# Patient Record
Sex: FEMALE | Race: WHITE | NOT HISPANIC OR LATINO | Employment: UNEMPLOYED | URBAN - METROPOLITAN AREA
[De-identification: names, ages, dates, MRNs, and addresses within clinical notes are randomized per-mention and may not be internally consistent; named-entity substitution may affect disease eponyms.]

---

## 2020-07-27 ENCOUNTER — HOSPITAL ENCOUNTER (EMERGENCY)
Facility: MEDICAL CENTER | Age: 45
End: 2020-07-27
Attending: EMERGENCY MEDICINE
Payer: COMMERCIAL

## 2020-07-27 ENCOUNTER — APPOINTMENT (OUTPATIENT)
Dept: RADIOLOGY | Facility: MEDICAL CENTER | Age: 45
End: 2020-07-27
Attending: EMERGENCY MEDICINE
Payer: COMMERCIAL

## 2020-07-27 VITALS
SYSTOLIC BLOOD PRESSURE: 122 MMHG | WEIGHT: 193.34 LBS | HEIGHT: 67 IN | BODY MASS INDEX: 30.35 KG/M2 | OXYGEN SATURATION: 95 % | RESPIRATION RATE: 18 BRPM | TEMPERATURE: 97.5 F | HEART RATE: 83 BPM | DIASTOLIC BLOOD PRESSURE: 83 MMHG

## 2020-07-27 DIAGNOSIS — M79.605 LEFT LEG PAIN: ICD-10-CM

## 2020-07-27 DIAGNOSIS — R60.9 PERIPHERAL EDEMA: ICD-10-CM

## 2020-07-27 LAB
ANION GAP SERPL CALC-SCNC: 12 MMOL/L (ref 7–16)
APTT PPP: 21.9 SEC (ref 24.7–36)
BASOPHILS # BLD AUTO: 0.9 % (ref 0–1.8)
BASOPHILS # BLD: 0.07 K/UL (ref 0–0.12)
BUN SERPL-MCNC: 11 MG/DL (ref 8–22)
CALCIUM SERPL-MCNC: 8.8 MG/DL (ref 8.5–10.5)
CHLORIDE SERPL-SCNC: 107 MMOL/L (ref 96–112)
CO2 SERPL-SCNC: 22 MMOL/L (ref 20–33)
CREAT SERPL-MCNC: 0.95 MG/DL (ref 0.5–1.4)
D DIMER PPP IA.FEU-MCNC: 0.37 UG/ML (FEU) (ref 0–0.5)
EKG IMPRESSION: NORMAL
EOSINOPHIL # BLD AUTO: 0.22 K/UL (ref 0–0.51)
EOSINOPHIL NFR BLD: 2.9 % (ref 0–6.9)
ERYTHROCYTE [DISTWIDTH] IN BLOOD BY AUTOMATED COUNT: 51.8 FL (ref 35.9–50)
GLUCOSE SERPL-MCNC: 115 MG/DL (ref 65–99)
HCG SERPL QL: NEGATIVE
HCT VFR BLD AUTO: 42.6 % (ref 37–47)
HGB BLD-MCNC: 13.5 G/DL (ref 12–16)
IMM GRANULOCYTES # BLD AUTO: 0.03 K/UL (ref 0–0.11)
IMM GRANULOCYTES NFR BLD AUTO: 0.4 % (ref 0–0.9)
INR PPP: 0.99 (ref 0.87–1.13)
LYMPHOCYTES # BLD AUTO: 3.25 K/UL (ref 1–4.8)
LYMPHOCYTES NFR BLD: 42.8 % (ref 22–41)
MCH RBC QN AUTO: 30.8 PG (ref 27–33)
MCHC RBC AUTO-ENTMCNC: 31.7 G/DL (ref 33.6–35)
MCV RBC AUTO: 97 FL (ref 81.4–97.8)
MONOCYTES # BLD AUTO: 0.5 K/UL (ref 0–0.85)
MONOCYTES NFR BLD AUTO: 6.6 % (ref 0–13.4)
NEUTROPHILS # BLD AUTO: 3.53 K/UL (ref 2–7.15)
NEUTROPHILS NFR BLD: 46.4 % (ref 44–72)
NRBC # BLD AUTO: 0.02 K/UL
NRBC BLD-RTO: 0.3 /100 WBC
NT-PROBNP SERPL IA-MCNC: 271 PG/ML (ref 0–125)
PLATELET # BLD AUTO: 275 K/UL (ref 164–446)
PMV BLD AUTO: 9.3 FL (ref 9–12.9)
POTASSIUM SERPL-SCNC: 3.4 MMOL/L (ref 3.6–5.5)
PROTHROMBIN TIME: 13.4 SEC (ref 12–14.6)
RBC # BLD AUTO: 4.39 M/UL (ref 4.2–5.4)
SODIUM SERPL-SCNC: 141 MMOL/L (ref 135–145)
TROPONIN T SERPL-MCNC: 9 NG/L (ref 6–19)
WBC # BLD AUTO: 7.6 K/UL (ref 4.8–10.8)

## 2020-07-27 PROCEDURE — 80048 BASIC METABOLIC PNL TOTAL CA: CPT

## 2020-07-27 PROCEDURE — 93971 EXTREMITY STUDY: CPT | Mod: 26,LT | Performed by: INTERNAL MEDICINE

## 2020-07-27 PROCEDURE — 85025 COMPLETE CBC W/AUTO DIFF WBC: CPT

## 2020-07-27 PROCEDURE — 85610 PROTHROMBIN TIME: CPT

## 2020-07-27 PROCEDURE — 99283 EMERGENCY DEPT VISIT LOW MDM: CPT

## 2020-07-27 PROCEDURE — 83880 ASSAY OF NATRIURETIC PEPTIDE: CPT

## 2020-07-27 PROCEDURE — 93005 ELECTROCARDIOGRAM TRACING: CPT | Performed by: EMERGENCY MEDICINE

## 2020-07-27 PROCEDURE — 84703 CHORIONIC GONADOTROPIN ASSAY: CPT

## 2020-07-27 PROCEDURE — 85730 THROMBOPLASTIN TIME PARTIAL: CPT

## 2020-07-27 PROCEDURE — 93971 EXTREMITY STUDY: CPT | Mod: LT

## 2020-07-27 PROCEDURE — 85379 FIBRIN DEGRADATION QUANT: CPT

## 2020-07-27 PROCEDURE — 84484 ASSAY OF TROPONIN QUANT: CPT

## 2020-07-27 ASSESSMENT — ENCOUNTER SYMPTOMS
VOMITING: 0
BACK PAIN: 0
CHILLS: 0
BLURRED VISION: 0
SORE THROAT: 0
SEIZURES: 0
COUGH: 0
ABDOMINAL PAIN: 0
EYE REDNESS: 0
FEVER: 0
NECK PAIN: 0
SHORTNESS OF BREATH: 1
HEADACHES: 0
FOCAL WEAKNESS: 0

## 2020-07-27 NOTE — ED TRIAGE NOTES
"Ele Page   45 y.o. female   Chief Complaint   Patient presents with   • Leg Pain     left leg, left leg is slightly swollen and red, had PE three weeks ago, thinks she may have blood clot in her left leg, is on eliquis though she is now out of it      Pt amb to triage with slow but steady gait for above complaint.      Pt is alert and oriented, speaking in full sentences, follows commands and responds appropriately to questions. Resp are even and unlabored.   Pt placed in lobby. Pt educated on triage process. Pt encouraged to alert staff for any changes.    /104   Pulse 89   Temp 36.3 °C (97.3 °F) (Temporal)   Resp 18   Ht 1.702 m (5' 7\")   Wt 87.7 kg (193 lb 5.5 oz)   SpO2 97%   BMI 30.28 kg/m²     "

## 2020-07-27 NOTE — ED PROVIDER NOTES
ED Provider Note    CHIEF COMPLAINT  Chief Complaint   Patient presents with   • Leg Pain     left leg, left leg is slightly swollen and red, had PE three weeks ago, thinks she may have blood clot in her left leg, is on eliquis though she is now out of it       HPI  Ele Page is a 45 y.o. 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  Patient reports pain started 2 days ago.  She notes pain to the back of the calf and the back of the knee with associated swelling.  She describes moderate, cramping, tight pain.  She denies any trauma to the extremity.  She has had intermittent chest pain over the last few weeks however was recently diagnosed with a pulmonary as embolism 3 weeks ago.  She also reports increased shortness of breath from her baseline from her COPD/asthma.  She is supposed to be taking Eliquis however ran out of the medication about a week and a half ago.  She did take a few days of Pradaxa that she had from a prior prescription.  The patient is visiting from [place] where she receives most of her medical care.    REVIEW OF SYSTEMS  See HPI for further details.   Review of Systems   Constitutional: Negative for chills and fever.   HENT: Negative for sore throat.    Eyes: Negative for blurred vision and redness.   Respiratory: Positive for shortness of breath. Negative for cough.    Cardiovascular: Positive for chest pain and leg swelling.   Gastrointestinal: Negative for abdominal pain and vomiting.   Genitourinary: Negative for dysuria and urgency.   Musculoskeletal: Negative for back pain and neck pain.        Left leg pain   Skin: Negative for rash.   Neurological: Negative for focal weakness, seizures and headaches.   Psychiatric/Behavioral: Negative for suicidal ideas.         PAST MEDICAL HISTORY       SOCIAL HISTORY  Social History     Tobacco Use   • Smoking status: Current Every Day Smoker     Packs/day: 2.00   •  "Smokeless tobacco: Never Used   Substance and Sexual Activity   • Alcohol use: Not Currently   • Drug use: Never   • Sexual activity: Not on file       SURGICAL HISTORY  patient denies any surgical history    CURRENT MEDICATIONS  Home Medications     Reviewed by Florentino Alvarado R.N. (Registered Nurse) on 07/27/20 at 0010  Med List Status: Unable to Obtain   Medication Last Dose Status        Patient Cabrera Taking any Medications                       ALLERGIES  No Known Allergies    PHYSICAL EXAM   VITAL SIGNS: /83   Pulse 83   Temp 36.4 °C (97.5 °F)   Resp 18   Ht 1.702 m (5' 7\")   Wt 87.7 kg (193 lb 5.5 oz)   SpO2 95%   BMI 30.28 kg/m²      Physical Exam   Constitutional: She is oriented to person, place, and time and well-developed, well-nourished, and in no distress. No distress.   Nontoxic-appearing middle-age female   HENT:   Head: Normocephalic and atraumatic.   Eyes: Pupils are equal, round, and reactive to light. Conjunctivae are normal.   Neck: Normal range of motion. Neck supple.   Cardiovascular: Normal rate, regular rhythm and normal heart sounds.   2+ DP pulses   Pulmonary/Chest: Effort normal and breath sounds normal. No respiratory distress.   Abdominal: Soft. She exhibits no distension. There is no abdominal tenderness.   Musculoskeletal: Normal range of motion.         General: Tenderness and edema present.      Comments: Slightly enlarged left calf, no pitting edema.  Good range of motion of the knee and ankle without significant pain.  No significant overlying erythema or warmth.   Neurological: She is alert and oriented to person, place, and time.   Moving all extremities spontaneously   Skin: Skin is warm and dry.   Psychiatric: Affect normal.         DIAGNOSTIC STUDIES    EKG  Results for orders placed or performed during the hospital encounter of 07/27/20   EKG (Now)   Result Value Ref Range    Report       [facility] Emergency Dept.    Test Date:  " 2020  Pt Name:    ROSALINO CERDA                Department: ER  MRN:        [id number]                      Room:       [facility]  Gender:     Female                       Technician: [id number]  :        1975                   Requested By:DEBORAH GOMEZ  Order #:    [id number]                    Reading MD: Deborah Gomez MD    Measurements  Intervals                                Axis  Rate:       82                           P:          60  LA:         160                          QRS:        -70  QRSD:       90                           T:          41  QT:         396  QTc:        463    Interpretive Statements  SINUS RHYTHM  REINALDO, CONSIDER BIATRIAL ABNORMALITIES  LEFT ANTERIOR FASCICULAR BLOCK  BORDERLINE R WAVE PROGRESSION, ANTERIOR LEADS  No ST or T wave change  No previous ECG available for comparison  Electronically Signed On 2020 1:01:12 PDT by Deborah Gomez MD             LABS  Personally reviewed by me  Labs Reviewed   CBC WITH DIFFERENTIAL - Abnormal; Notable for the following components:       Result Value    MCHC 31.7 (*)     RDW 51.8 (*)     Lymphocytes 42.80 (*)     All other components within normal limits   BASIC METABOLIC PANEL - Abnormal; Notable for the following components:    Potassium 3.4 (*)     Glucose 115 (*)     All other components within normal limits   PROBRAIN NATRIURETIC PEPTIDE, NT - Abnormal; Notable for the following components:    NT-proBNP 271 (*)     All other components within normal limits   APTT - Abnormal; Notable for the following components:    APTT 21.9 (*)     All other components within normal limits    Narrative:     Indicate which anticoagulants the patient is on:->UNKNOWN   HCG QUAL SERUM   TROPONIN   D-DIMER   PROTHROMBIN TIME    Narrative:     Indicate which anticoagulants the patient is on:->UNKNOWN   ESTIMATED GFR           RADIOLOGY  Personally reviewed by me  US-EXTREMITY VENOUS LOWER UNILAT LEFT               ED COURSE  Vitals:    20 0004  "07/27/20 0007 07/27/20 0100 07/27/20 0158   BP: 145/104  124/86 122/83   Pulse: 89  79 83   Resp: 18  18 18   Temp: 36.3 °C (97.3 °F)   36.4 °C (97.5 °F)   TempSrc: Temporal      SpO2: 97%  95% 95%   Weight:  87.7 kg (193 lb 5.5 oz)     Height: 1.702 m (5' 7\")            Medications administered:  Medications - No data to display      Old records personally reviewed:  Reviewed records through care everywhere.  Patient has had an extensive history of DVTs, one that required thrombolysis in the upper extremity.  She is supposed to be on anticoagulation for life and did fail warfarin in the past.        MEDICAL DECISION MAKING  Patient with history of recurrent DVT/PEs who is supposed to be on Eliquis however did run out of her medication about a week ago and is now presenting with left lower extremity pain and swelling.  She is nontoxic-appearing with normal vital signs on arrival.  Her exam demonstrates possible mild nonpitting edema to the left lower extremity although it is not very significant.  She has no evidence of neurovascular compromise, septic arthritis, or trauma on exam.  Lower extremity ultrasound is negative for DVT.  Labs are reassuring including a negative d-dimer therefore my suspicion for underlying clot or pulmonary embolism is very low.  EKG is not concerning for ischemia or arrhythmia and troponin is negative without concern for ACS.  BNP is not significantly elevated to suggest decompensated heart failure.    Upon reassessment, patient is resting comfortably with normal vital signs.  No new complaints at this time.  Discussed results with patient and/or family as well as importance of primary care follow up.  She will be provided prescription for her Eliquis for the next month until she is able to follow-up with her primary care doctor when she returns to [place].  Patient understands plan of care and strict return precautions for new or changing symptoms.         IMPRESSION  (M79.605) Left " leg pain  (R60.9) Peripheral edema    Disposition: Discharge home, stable condition  Results, diagnoses, and treatment options were discussed with the patient and/or family. Patient verbalized understanding of plan of care.    Patient referred to primary care provider for monitoring and treatment of blood pressure.      Discharge Medication List as of 7/27/2020  2:01 AM      START taking these medications    Details   apixaban (ELIQUIS) 5mg Tab Take 1 Tab by mouth 2 Times a Day., Disp-60 Tab,R-0, Print Rx Paper                 Electronically signed by: Deborah Causey M.D., 7/27/2020 12:58 AM

## 2020-07-27 NOTE — DISCHARGE INSTRUCTIONS
You were seen in the Emergency Department for leg pain.      EKG, labs, ultrasound were completed without significant acute abnormalities.    Please use 1,000mg of tylenol every 6 hours as needed for pain.  Continue Eliquis as directed.    Please follow up with your primary care physician.    Return to the Emergency Department with worsening pain or swelling, chest worsening chest pain or shortness of breath, fevers, or other concerns.

## 2020-09-26 ENCOUNTER — HOSPITAL ENCOUNTER (EMERGENCY)
Facility: MEDICAL CENTER | Age: 45
End: 2020-09-26
Attending: EMERGENCY MEDICINE
Payer: COMMERCIAL

## 2020-09-26 ENCOUNTER — APPOINTMENT (OUTPATIENT)
Dept: RADIOLOGY | Facility: MEDICAL CENTER | Age: 45
End: 2020-09-26
Attending: EMERGENCY MEDICINE
Payer: COMMERCIAL

## 2020-09-26 VITALS
RESPIRATION RATE: 18 BRPM | HEART RATE: 74 BPM | BODY MASS INDEX: 28.48 KG/M2 | SYSTOLIC BLOOD PRESSURE: 117 MMHG | HEIGHT: 67 IN | DIASTOLIC BLOOD PRESSURE: 79 MMHG | OXYGEN SATURATION: 96 % | TEMPERATURE: 97 F | WEIGHT: 181.44 LBS

## 2020-09-26 DIAGNOSIS — M79.602 LEFT ARM PAIN: ICD-10-CM

## 2020-09-26 DIAGNOSIS — R07.89 OTHER CHEST PAIN: ICD-10-CM

## 2020-09-26 DIAGNOSIS — L03.112 CELLULITIS OF LEFT AXILLA: ICD-10-CM

## 2020-09-26 LAB
D DIMER PPP IA.FEU-MCNC: <0.27 UG/ML (FEU) (ref 0–0.5)
EKG IMPRESSION: NORMAL

## 2020-09-26 PROCEDURE — 700102 HCHG RX REV CODE 250 W/ 637 OVERRIDE(OP): Performed by: EMERGENCY MEDICINE

## 2020-09-26 PROCEDURE — 93005 ELECTROCARDIOGRAM TRACING: CPT | Performed by: EMERGENCY MEDICINE

## 2020-09-26 PROCEDURE — 93005 ELECTROCARDIOGRAM TRACING: CPT

## 2020-09-26 PROCEDURE — 85379 FIBRIN DEGRADATION QUANT: CPT

## 2020-09-26 PROCEDURE — A9270 NON-COVERED ITEM OR SERVICE: HCPCS | Performed by: EMERGENCY MEDICINE

## 2020-09-26 PROCEDURE — 36415 COLL VENOUS BLD VENIPUNCTURE: CPT

## 2020-09-26 PROCEDURE — 99284 EMERGENCY DEPT VISIT MOD MDM: CPT

## 2020-09-26 PROCEDURE — 71045 X-RAY EXAM CHEST 1 VIEW: CPT

## 2020-09-26 RX ORDER — CEPHALEXIN 500 MG/1
500 CAPSULE ORAL ONCE
Status: COMPLETED | OUTPATIENT
Start: 2020-09-26 | End: 2020-09-26

## 2020-09-26 RX ORDER — CEPHALEXIN 500 MG/1
500 TABLET ORAL 3 TIMES DAILY
Qty: 21 TAB | Refills: 0 | Status: SHIPPED | OUTPATIENT
Start: 2020-09-26 | End: 2020-10-03

## 2020-09-26 RX ORDER — ACETAMINOPHEN 325 MG/1
650 TABLET ORAL ONCE
Status: COMPLETED | OUTPATIENT
Start: 2020-09-26 | End: 2020-09-26

## 2020-09-26 RX ADMIN — ACETAMINOPHEN 650 MG: 325 TABLET, FILM COATED ORAL at 10:03

## 2020-09-26 RX ADMIN — CEPHALEXIN 500 MG: 500 CAPSULE ORAL at 13:37

## 2020-09-26 NOTE — ED PROVIDER NOTES
ED Provider Note    Scribed for Felton Sousa M.D. by Aarti Lubin. 9/26/2020  9:43 AM    Primary care provider: None  Means of arrival: Walk-in  History obtained from: Patient  History limited by: None    CHIEF COMPLAINT  Chief Complaint   Patient presents with   • Arm Pain   • Rib Pain   • Chest Pressure       HPI  Ele Page is a 45 y.o. female with a history of PE and DVT who presents to the Emergency Department for evaluation of upper left arm pain. She noticed the pain this morning when touching her outer arm. The patient states that the pain is similar to DVT pain in the past. The patient endorses associated chest pain which started this morning and last seconds at a time in duration. She reports that the chest pain is mild and is exacerbated with inspiration. She denies any associated diaphoresis, nausea, fever, cough, or COVID exposure. Additionally, the patient reports having an abscess in the left axilla. The patient had ad DVT in her left arm 5 years ago and then a PE 5 months ago. She was originally taking Pradaxa but is now on Eliquis. The patient has a history of hypertension but denies a history of hyperlipidemia. The patient has a family history of DVT. She is a current everyday smoker. Denies IV medication or IV drug use.     REVIEW OF SYSTEMS  Pertinent positives include: left arm pain and chest pain.  Pertinent negatives include: diaphoresis, nausea, fever, cough, or COVID exposure.  10+ systems reviewed and negative.      PAST MEDICAL HISTORY  Thromboembolic disease    FAMILY HISTORY  Surprisingly no thromboembolic disease    SOCIAL HISTORY  Social History     Tobacco Use   • Smoking status: Current Every Day Smoker     Packs/day: 2.00   • Smokeless tobacco: Never Used   Substance Use Topics   • Alcohol use: Not Currently   • Drug use: Never     Social History     Substance and Sexual Activity   Drug Use Never     CURRENT MEDICATIONS  Current Outpatient Medications:   •  apixaban  "(ELIQUIS) 5mg Tab, Take 1 Tab by mouth 2 Times a Day., Disp: 60 Tab, Rfl: 0    ALLERGIES  No Known Allergies    PHYSICAL EXAM  VITAL SIGNS: /96   Pulse 78   Temp 36.1 °C (97 °F) (Temporal)   Resp 18   Ht 1.702 m (5' 7\")   Wt 82.3 kg (181 lb 7 oz)   SpO2 99%   BMI 28.42 kg/m²   Reviewed and elevated blood pressure  Constitutional: Well developed, Well nourished  HENT: Normocephalic, atraumatic, bilateral external ears normal, wearing a mask.   Eyes: PERRLA, conjunctiva pink, no scleral icterus.   Cardiovascular: Regular rate and rhythm. 1/6murmur at the base, rubs or gallops.  No dependent edema or calf tenderness  Respiratory: Lungs clear to auscultation bilaterally. No wheezes, rales, or rhonchi.  Abdominal:  Abdomen soft, non-tender, non distended. No rebound, or guarding.    Skin: 2.5 cm induration in the left axilla with some crusting, nontender, no fluctuant.. Many axilla scars. No erythema, no rash.   Genitourinary: No costovertebral angle tenderness.   Musculoskeletal: Right lateral lower chest wall tenderness. Area of tenderness over left lateral deltoid with no palpable ropes. no deformities.   Neurologic: Alert & oriented x 3, cranial nerves 2-12 intact by passive exam.  No focal deficit noted.  Psychiatric: Affect normal, Judgment normal, Mood normal.     DIFFERENTIAL DIAGNOSIS:  Axillar abscess, hidradenitis suppurativa, DVT, PE, pneumonia, pneumothorax, doubt ACS    EKG  Interpreted by me    Rhythm:  Normal sinus rhythm   Rate: 76  Axis: -77  Ectopy: none  Conduction: normal  ST Segments: left anterior fasciculus block  T Waves: no acute change  Q Waves: none  Clinical Impression: Normal EKG without acute changes     RADIOLOGY/PROCEDURES  DX-CHEST-LIMITED (1 VIEW)   Final Result      No acute cardiopulmonary abnormality identified.        Radiologist interpretation have been reviewed by me.     LABORATORY:  Results for orders placed or performed during the hospital encounter of 09/26/20 "   D-DIMER   Result Value Ref Range    D-Dimer Screen <0.27 0.00 - 0.50 ug/mL (FEU)     Lab results reviewed by me.     INTERVENTIONS:  Medications   acetaminophen (TYLENOL) tablet 650 mg (650 mg Oral Given 9/26/20 1003)     Response: Improved.    ED COURSE:  Nursing notes, VS, PMSFHx reviewed in chart.     9:43 AM - Patient seen and examined at bedside. Patient will be treated with Tylenol 650 mg for her symptoms. Ordered EKG, DX-Chest, and D-Dimer to evaluate.     12:13 PM - I informed the patient that her lab and imaging results are reassuring.     12:20 PM - I discussed the patient's case with Social Work.     12:50 PM - Social Work updated me on resolved insurance issue.       MEDICAL DECISION MAKING:  Well-appearing patient presents with fears of DVT while on Eliquis.  She has had clots before this on warfarin and Pradaxa.  She also is concerned about an infection of the axilla.  There is no evidence of abscess or significant cellulitis but there is crusting as if there is been drainage to the axilla so I will treat with a course of Keflex.  Given negative d-dimer and location of her arm pain in the deltoid DVT is very unlikely as is PE so ultrasound and CT not necessary.  Her chest pain is reproducible on exam and her heart score is 1 making acute coronary syndrome unlikely.  There is no evidence of pneumonia or pneumothorax.    PLAN:  Discharge Medication List as of 9/26/2020  1:15 PM      START taking these medications    Details   Cephalexin 500 MG Tab Take 1 Tab by mouth 3 times a day for 7 days., Disp-21 Tab,R-0, Print Rx Paper         Eliquis refill  Chest pain nonspecific handout given  Return for shortness of breath, changing chest pain, leg swelling, ill appearance    [address] [address]  [phone]          CONDITION: good.     FINAL IMPRESSION  1. Other chest pain    2. Left arm pain    3. Cellulitis of left axilla          Aarti PAIZ  (Scribe), am scribing for, and in the presence of, Felton Sousa M.D..    Electronically signed by: Aarti Lubin (Scribe), 9/26/2020    I, Felton Sousa M.D. personally performed the services described in this documentation, as scribed by Aarti Lubin in my presence, and it is both accurate and complete.    C.     The note accurately reflects work and decisions made by me.  Felton Sousa M.D.  9/26/2020  3:45 PM

## 2020-09-26 NOTE — ED NOTES
"Message sent to pharmacy regarding antibiotic verification. Pt standing at doorway waiting for discharge, states she can wait \"a few minutes\" but is anxious to leave.   "

## 2020-09-26 NOTE — ED NOTES
Pt discharged to home. Pt was given follow up instructions and prescriptions for eliquis and rocephin. Pt verbalized understanding of all instructions for discharge and is ambulatory out of ED with steady gait.

## 2020-09-26 NOTE — ED NOTES
"Pt to triage, c/o lt arm pain, states \" she has a hx of DVT's , not sure if she has one again\" . Pt also has a hx of PE's , c/o rt rib and lt chest pressure. EKG done in triage   "

## 2020-09-26 NOTE — DISCHARGE INSTRUCTIONS
Continue the Eliquis twice daily.  Take Keflex as prescribed for possible armpit infection.  Return for worsening chest pain, significant shortness of breath, fever and spreading redness or ill appearance.  Follow-up with the [facility] clinic.  If you stay in [place] switch your insurance to Medicaid.    You had a borderline or high normal blood pressure reading today.  This does not necessarily mean you have hypertension.  Please followup with your/a primary physician for comprehensive blood pressure evaluation and yearly fasting cholesterol assessment.  BP Readings from Last 3 Encounters:   09/26/20 158/83   07/27/20 122/83

## 2020-09-26 NOTE — DISCHARGE PLANNING
Verified patient can fill meds at [facility].  Made patient aware needs aprimary care MD we are not here for refills.  Gave her resources for [facility]

## 2020-10-27 ENCOUNTER — APPOINTMENT (OUTPATIENT)
Dept: RADIOLOGY | Facility: MEDICAL CENTER | Age: 45
End: 2020-10-27
Attending: EMERGENCY MEDICINE
Payer: COMMERCIAL

## 2020-10-27 ENCOUNTER — HOSPITAL ENCOUNTER (EMERGENCY)
Facility: MEDICAL CENTER | Age: 45
End: 2020-10-28
Attending: EMERGENCY MEDICINE
Payer: COMMERCIAL

## 2020-10-27 ENCOUNTER — APPOINTMENT (OUTPATIENT)
Dept: RADIOLOGY | Facility: MEDICAL CENTER | Age: 45
End: 2020-10-27
Payer: COMMERCIAL

## 2020-10-27 DIAGNOSIS — F10.929 ALCOHOLIC INTOXICATION WITH COMPLICATION (HCC): ICD-10-CM
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
## 2020-10-27 DIAGNOSIS — K76.9 LIVER LESION: ICD-10-CM

## 2020-10-27 DIAGNOSIS — S09.90XA CLOSED HEAD INJURY, INITIAL ENCOUNTER: ICD-10-CM

## 2020-10-27 DIAGNOSIS — Z79.01 ANTICOAGULATED: ICD-10-CM

## 2020-10-27 DIAGNOSIS — I71.21 ANEURYSM OF ASCENDING AORTA (HCC): ICD-10-CM

## 2020-10-27 DIAGNOSIS — M54.2 CERVICALGIA: ICD-10-CM

## 2020-10-27 DIAGNOSIS — S00.31XA ABRASION OF NOSE, INITIAL ENCOUNTER: ICD-10-CM

## 2020-10-27 LAB
ABO GROUP BLD: NORMAL
ALBUMIN SERPL BCP-MCNC: 3.6 G/DL (ref 3.2–4.9)
ALBUMIN/GLOB SERPL: 1.5 G/DL
ALP SERPL-CCNC: 56 U/L (ref 30–99)
ALT SERPL-CCNC: 12 U/L (ref 2–50)
ANION GAP SERPL CALC-SCNC: 11 MMOL/L (ref 7–16)
APTT PPP: 23.8 SEC (ref 24.7–36)
AST SERPL-CCNC: 8 U/L (ref 12–45)
BILIRUB SERPL-MCNC: 0.2 MG/DL (ref 0.1–1.5)
BLD GP AB SCN SERPL QL: NORMAL
BUN SERPL-MCNC: 10 MG/DL (ref 8–22)
CALCIUM SERPL-MCNC: 8.5 MG/DL (ref 8.5–10.5)
CFT BLD TEG: 5.4 MIN (ref 5–10)
CHLORIDE SERPL-SCNC: 104 MMOL/L (ref 96–112)
CLOT ANGLE BLD TEG: 55.1 DEGREES (ref 53–72)
CLOT LYSIS 30M P MA LENFR BLD TEG: 0 % (ref 0–8)
CO2 SERPL-SCNC: 21 MMOL/L (ref 20–33)
CREAT SERPL-MCNC: 0.74 MG/DL (ref 0.5–1.4)
CT.EXTRINSIC BLD ROTEM: 2.8 MIN (ref 1–3)
ERYTHROCYTE [DISTWIDTH] IN BLOOD BY AUTOMATED COUNT: 51.4 FL (ref 35.9–50)
ETHANOL BLD-MCNC: 180.8 MG/DL (ref 0–10)
GLOBULIN SER CALC-MCNC: 2.4 G/DL (ref 1.9–3.5)
GLUCOSE SERPL-MCNC: 88 MG/DL (ref 65–99)
HCG SERPL QL: NEGATIVE
HCT VFR BLD AUTO: 42.5 % (ref 37–47)
HGB BLD-MCNC: 13.5 G/DL (ref 12–16)
INR PPP: 1.07 (ref 0.87–1.13)
MCF BLD TEG: 56.5 MM (ref 50–70)
MCH RBC QN AUTO: 30.8 PG (ref 27–33)
MCHC RBC AUTO-ENTMCNC: 31.8 G/DL (ref 33.6–35)
MCV RBC AUTO: 97 FL (ref 81.4–97.8)
PA AA BLD-ACNC: 13.6 %
PA ADP BLD-ACNC: 46.7 %
PLATELET # BLD AUTO: 209 K/UL (ref 164–446)
PMV BLD AUTO: 9.4 FL (ref 9–12.9)
POTASSIUM SERPL-SCNC: 3.8 MMOL/L (ref 3.6–5.5)
PROT SERPL-MCNC: 6 G/DL (ref 6–8.2)
PROTHROMBIN TIME: 14.2 SEC (ref 12–14.6)
RBC # BLD AUTO: 4.38 M/UL (ref 4.2–5.4)
RH BLD: NORMAL
SODIUM SERPL-SCNC: 136 MMOL/L (ref 135–145)
TEG ALGORITHM TGALG: NORMAL
WBC # BLD AUTO: 6.3 K/UL (ref 4.8–10.8)

## 2020-10-27 PROCEDURE — 72125 CT NECK SPINE W/O DYE: CPT

## 2020-10-27 PROCEDURE — A9270 NON-COVERED ITEM OR SERVICE: HCPCS | Performed by: EMERGENCY MEDICINE

## 2020-10-27 PROCEDURE — 71045 X-RAY EXAM CHEST 1 VIEW: CPT

## 2020-10-27 PROCEDURE — 85576 BLOOD PLATELET AGGREGATION: CPT | Mod: 91

## 2020-10-27 PROCEDURE — 85610 PROTHROMBIN TIME: CPT

## 2020-10-27 PROCEDURE — 70450 CT HEAD/BRAIN W/O DYE: CPT

## 2020-10-27 PROCEDURE — 700111 HCHG RX REV CODE 636 W/ 250 OVERRIDE (IP): Performed by: EMERGENCY MEDICINE

## 2020-10-27 PROCEDURE — 86850 RBC ANTIBODY SCREEN: CPT

## 2020-10-27 PROCEDURE — 99285 EMERGENCY DEPT VISIT HI MDM: CPT

## 2020-10-27 PROCEDURE — 80307 DRUG TEST PRSMV CHEM ANLYZR: CPT

## 2020-10-27 PROCEDURE — 72131 CT LUMBAR SPINE W/O DYE: CPT

## 2020-10-27 PROCEDURE — 84703 CHORIONIC GONADOTROPIN ASSAY: CPT

## 2020-10-27 PROCEDURE — 85347 COAGULATION TIME ACTIVATED: CPT

## 2020-10-27 PROCEDURE — 86900 BLOOD TYPING SEROLOGIC ABO: CPT

## 2020-10-27 PROCEDURE — 85384 FIBRINOGEN ACTIVITY: CPT

## 2020-10-27 PROCEDURE — 85730 THROMBOPLASTIN TIME PARTIAL: CPT

## 2020-10-27 PROCEDURE — 72128 CT CHEST SPINE W/O DYE: CPT

## 2020-10-27 PROCEDURE — 305948 HCHG GREEN TRAUMA ACT PRE-NOTIFY NO CC

## 2020-10-27 PROCEDURE — 700117 HCHG RX CONTRAST REV CODE 255: Performed by: EMERGENCY MEDICINE

## 2020-10-27 PROCEDURE — 86901 BLOOD TYPING SEROLOGIC RH(D): CPT

## 2020-10-27 PROCEDURE — 80053 COMPREHEN METABOLIC PANEL: CPT

## 2020-10-27 PROCEDURE — 71260 CT THORAX DX C+: CPT

## 2020-10-27 PROCEDURE — 90715 TDAP VACCINE 7 YRS/> IM: CPT | Performed by: EMERGENCY MEDICINE

## 2020-10-27 PROCEDURE — 700102 HCHG RX REV CODE 250 W/ 637 OVERRIDE(OP): Performed by: EMERGENCY MEDICINE

## 2020-10-27 PROCEDURE — 85027 COMPLETE CBC AUTOMATED: CPT

## 2020-10-27 PROCEDURE — 90471 IMMUNIZATION ADMIN: CPT

## 2020-10-27 RX ORDER — HYDROCODONE BITARTRATE AND ACETAMINOPHEN 5; 325 MG/1; MG/1
1 TABLET ORAL ONCE
Status: COMPLETED | OUTPATIENT
Start: 2020-10-27 | End: 2020-10-27

## 2020-10-27 RX ORDER — ACETAMINOPHEN 325 MG/1
650 TABLET ORAL ONCE
Status: COMPLETED | OUTPATIENT
Start: 2020-10-27 | End: 2020-10-27

## 2020-10-27 RX ORDER — HYDROCODONE BITARTRATE AND ACETAMINOPHEN 5; 325 MG/1; MG/1
1 TABLET ORAL EVERY 6 HOURS PRN
Qty: 12 TAB | Refills: 0 | Status: SHIPPED | OUTPATIENT
Start: 2020-10-27 | End: 2020-10-30

## 2020-10-27 RX ORDER — CYCLOBENZAPRINE HCL 10 MG
10 TABLET ORAL 3 TIMES DAILY PRN
Qty: 30 TAB | Refills: 0 | Status: SHIPPED | OUTPATIENT
Start: 2020-10-27 | End: 2022-12-20

## 2020-10-27 RX ADMIN — IOHEXOL 100 ML: 350 INJECTION, SOLUTION INTRAVENOUS at 16:41

## 2020-10-27 RX ADMIN — ACETAMINOPHEN 650 MG: 325 TABLET, FILM COATED ORAL at 21:21

## 2020-10-27 RX ADMIN — CLOSTRIDIUM TETANI TOXOID ANTIGEN (FORMALDEHYDE INACTIVATED), CORYNEBACTERIUM DIPHTHERIAE TOXOID ANTIGEN (FORMALDEHYDE INACTIVATED), BORDETELLA PERTUSSIS TOXOID ANTIGEN (GLUTARALDEHYDE INACTIVATED), BORDETELLA PERTUSSIS FILAMENTOUS HEMAGGLUTININ ANTIGEN (FORMALDEHYDE INACTIVATED), BORDETELLA PERTUSSIS PERTACTIN ANTIGEN, AND BORDETELLA PERTUSSIS FIMBRIAE 2/3 ANTIGEN 0.5 ML: 5; 2; 2.5; 5; 3; 5 INJECTION, SUSPENSION INTRAMUSCULAR at 19:29

## 2020-10-27 RX ADMIN — APIXABAN 5 MG: 5 TABLET, FILM COATED ORAL at 22:24

## 2020-10-27 RX ADMIN — HYDROCODONE BITARTRATE AND ACETAMINOPHEN 1 TABLET: 5; 325 TABLET ORAL at 21:40

## 2020-10-27 ASSESSMENT — LIFESTYLE VARIABLES
HAVE PEOPLE ANNOYED YOU BY CRITICIZING YOUR DRINKING: NO
HAVE YOU EVER FELT YOU SHOULD CUT DOWN ON YOUR DRINKING: NO
TOTAL SCORE: 0
EVER HAD A DRINK FIRST THING IN THE MORNING TO STEADY YOUR NERVES TO GET RID OF A HANGOVER: NO
TOTAL SCORE: 0
DO YOU DRINK ALCOHOL: YES
CONSUMPTION TOTAL: INCOMPLETE
TOTAL SCORE: 0
EVER FELT BAD OR GUILTY ABOUT YOUR DRINKING: NO

## 2020-10-27 ASSESSMENT — PAIN DESCRIPTION - PAIN TYPE: TYPE: ACUTE PAIN
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
## 2020-10-27 NOTE — Clinical Note
Ele Page was seen and treated in our emergency department on 10/24/2020.  She may return to work on 10/30/2020.       If you have any questions or concerns, please don't hesitate to call.      Letty Dalton M.D.

## 2020-10-28 VITALS
TEMPERATURE: 97.2 F | DIASTOLIC BLOOD PRESSURE: 88 MMHG | OXYGEN SATURATION: 99 % | WEIGHT: 190 LBS | RESPIRATION RATE: 16 BRPM | HEART RATE: 88 BPM | HEIGHT: 67 IN | BODY MASS INDEX: 29.82 KG/M2 | SYSTOLIC BLOOD PRESSURE: 136 MMHG

## 2020-10-28 NOTE — DISCHARGE INSTRUCTIONS
Please have a responsible adult stay with you until tomorrow afternoon so they can monitor you for changes in your behavior and mental state because you had a head injury today.    Return to the ER for worsening symptoms, numbness, vomiting, headache, or other concerns.     Establish care with a primary care provider.    Call a neurosurgeon to see them in clinic for follow-up for your neck pain and wear the collar on your neck until you follow-up with them.    You have an aneurysm of your aorta leading to your heart.  This will need follow-up/monitoring by Dr. Michael Bloch or a primary care doctor.    You have a lesion on your liver that will also need follow-up.

## 2020-10-28 NOTE — ED NOTES
Med rec partially complete. Pt cummings snot know all of her medications. Med tech will attempt to follow up with pharmacy tomorrow since they are closed for the day

## 2021-02-10 ENCOUNTER — APPOINTMENT (OUTPATIENT)
Dept: RADIOLOGY | Facility: MEDICAL CENTER | Age: 46
End: 2021-02-10
Attending: EMERGENCY MEDICINE
Payer: MEDICAID

## 2021-02-10 ENCOUNTER — HOSPITAL ENCOUNTER (EMERGENCY)
Facility: MEDICAL CENTER | Age: 46
End: 2021-02-10
Attending: EMERGENCY MEDICINE | Admitting: EMERGENCY MEDICINE
Payer: MEDICAID

## 2021-02-10 VITALS
BODY MASS INDEX: 28.6 KG/M2 | WEIGHT: 188.71 LBS | DIASTOLIC BLOOD PRESSURE: 105 MMHG | HEART RATE: 77 BPM | SYSTOLIC BLOOD PRESSURE: 144 MMHG | TEMPERATURE: 98.6 F | OXYGEN SATURATION: 95 % | HEIGHT: 68 IN | RESPIRATION RATE: 16 BRPM

## 2021-02-10 DIAGNOSIS — I10 HYPERTENSION, UNSPECIFIED TYPE: ICD-10-CM

## 2021-02-10 DIAGNOSIS — Z20.822 SUSPECTED COVID-19 VIRUS INFECTION: ICD-10-CM

## 2021-02-10 DIAGNOSIS — M79.10 MYALGIA: ICD-10-CM

## 2021-02-10 DIAGNOSIS — J06.9 VIRAL URI WITH COUGH: ICD-10-CM

## 2021-02-10 PROCEDURE — 71045 X-RAY EXAM CHEST 1 VIEW: CPT

## 2021-02-10 PROCEDURE — U0005 INFEC AGEN DETEC AMPLI PROBE: HCPCS

## 2021-02-10 PROCEDURE — 99284 EMERGENCY DEPT VISIT MOD MDM: CPT

## 2021-02-10 PROCEDURE — U0003 INFECTIOUS AGENT DETECTION BY NUCLEIC ACID (DNA OR RNA); SEVERE ACUTE RESPIRATORY SYNDROME CORONAVIRUS 2 (SARS-COV-2) (CORONAVIRUS DISEASE [COVID-19]), AMPLIFIED PROBE TECHNIQUE, MAKING USE OF HIGH THROUGHPUT TECHNOLOGIES AS DESCRIBED BY CMS-2020-01-R: HCPCS

## 2021-02-10 RX ORDER — LOSARTAN POTASSIUM 50 MG/1
50 TABLET ORAL DAILY
Status: SHIPPED | COMMUNITY
End: 2022-12-20

## 2021-02-10 ASSESSMENT — FIBROSIS 4 INDEX: FIB4 SCORE: 0.51

## 2021-02-11 LAB
SARS-COV-2 RNA RESP QL NAA+PROBE: DETECTED
SPECIMEN SOURCE: ABNORMAL

## 2021-02-11 NOTE — ED NOTES
Pt provided with discharge paper work and education on how to access covid results. Pt to ambulate out of the ER.

## 2021-02-11 NOTE — ED PROVIDER NOTES
ED Provider Note    CHIEF COMPLAINT  Chief Complaint   Patient presents with   • Coronavirus Screening     pt exposed to COVId via grand son    • Sore Throat     started monday night    • Cough   • Abdominal Pain   • Fever   • Generalized Body Aches       HPI  Ele Page is a 45 y.o. female who presents with cough, body aches, shoulder pain, nausea, abdominal cramping and fever for the last 2 to 3 days.  She has a family member in her household who tested positive for coronavirus today.  No vomiting.  Mild loose stool.  Patient tolerating oral intake well, normal urination.  Patient feels mildly short of breath.  No pleurisy    REVIEW OF SYSTEMS  Constitutional: Fever  Respiratory: Cough  ENT sore throat  Gastrointestinal: No abdominal pain currently  Musculoskeletal: Remittent shoulder pain bilateral.  Body aches.  No neck stiffness    PAST MEDICAL HISTORY  Past Medical History:   Diagnosis Date   • Acute DVT (deep venous thrombosis) (HCC)    • Hypertension        FAMILY HISTORY  History reviewed. No pertinent family history.    SOCIAL HISTORY  Social History     Socioeconomic History   • Marital status: Legally      Spouse name: Not on file   • Number of children: Not on file   • Years of education: Not on file   • Highest education level: Not on file   Occupational History   • Not on file   Tobacco Use   • Smoking status: Current Every Day Smoker     Packs/day: 2.00   • Smokeless tobacco: Never Used   Substance and Sexual Activity   • Alcohol use: Not Currently   • Drug use: Not Currently   • Sexual activity: Not on file   Other Topics Concern   • Not on file   Social History Narrative    ** Merged History Encounter **          Social Determinants of Health     Financial Resource Strain:    • Difficulty of Paying Living Expenses:    Food Insecurity:    • Worried About Running Out of Food in the Last Year:    • Ran Out of Food in the Last Year:    Transportation Needs:    • Lack of Transportation  "(Medical):    • Lack of Transportation (Non-Medical):    Physical Activity:    • Days of Exercise per Week:    • Minutes of Exercise per Session:    Stress:    • Feeling of Stress :    Social Connections:    • Frequency of Communication with Friends and Family:    • Frequency of Social Gatherings with Friends and Family:    • Attends Bahai Services:    • Active Member of Clubs or Organizations:    • Attends Club or Organization Meetings:    • Marital Status:    Intimate Partner Violence:    • Fear of Current or Ex-Partner:    • Emotionally Abused:    • Physically Abused:    • Sexually Abused:        SURGICAL HISTORY  History reviewed. No pertinent surgical history.    CURRENT MEDICATIONS  No current facility-administered medications on file prior to encounter.     Current Outpatient Medications on File Prior to Encounter   Medication Sig Dispense Refill   • losartan (COZAAR) 50 MG Tab Take 50 mg by mouth every day.     • PROPRANOLOL HCL PO Take 20 mg by mouth 2 Times a Day.     • Non Formulary Request Take 1 Tab by mouth every day. **unknown BP**     • cyclobenzaprine (FLEXERIL) 10 mg Tab Take 1 Tab by mouth 3 times a day as needed for Moderate Pain or Muscle Spasms. (Patient not taking: Reported on 2/10/2021) 30 Tab 0   • apixaban (ELIQUIS) 5mg Tab Take 1 Tab by mouth 2 Times a Day. 60 Tab 0   • apixaban (ELIQUIS) 5mg Tab Take 1 Tab by mouth 2 Times a Day. 60 Tab 0       ALLERGIES  No Known Allergies    PHYSICAL EXAM  VITAL SIGNS: /105   Pulse 77   Temp 37 °C (98.6 °F) (Temporal)   Resp 16   Ht 1.727 m (5' 8\")   Wt 85.6 kg (188 lb 11.4 oz)   LMP 01/19/2021 (Approximate)   SpO2 95%   BMI 28.69 kg/m²   Constitutional:  Well nourished, No acute distress.   HENT: Posterior pharynx normal.  No facial swelling  Lymphatics: No adenopathy  Eyes:  Conjunctiva normal, No discharge.    Cardiovascular: The heart is regular rhythm, normal rate  Pulmonary: Breath sounds mildly coarse both lung bases, no " crackles, no overt wheezing.  Skin: No cyanosis.   Musculoskeletal: Neck nontender, no chest wall retractions.  There is a gentle muscular soreness to the upper trapezius and deltoid region.  Neurologic: speech is clear, no ataxia   Psychiatric:  Mood normal.  Cooperative    DX-CHEST-PORTABLE (1 VIEW)   Final Result         1.  No acute cardiopulmonary disease.              COURSE & MEDICAL DECISION MAKING  Pertinent Labs & Imaging studies reviewed. (See chart for details)  X-ray is negative.  I suspect COVID-19 infection given her family exposure.  The test was sent and is pending, she will use my chart application to look up her results tomorrow.  Patient advised to socially distance and self isolate until results known and act accordingly.  Patient is comfortable in appearance at this time, stable for discharge.  Patient has a history of hypertension, is advised to follow-up with her doctor for recheck soon as possible.    FINAL IMPRESSION     1. Viral URI with cough     2. Myalgia     3. Suspected COVID-19 virus infection     4. Hypertension, unspecified type                  Electronically signed by: Adryan Lew M.D., 2/10/2021 9:56 PM

## 2021-02-11 NOTE — ED TRIAGE NOTES
Pt comes in c/o COVID exposure from grandson who tested positive  Her symptoms started on Monday night  Cough, fever, body aches  abdomen pain  Decreased appetite   Here for evaluation of complaints

## 2021-02-11 NOTE — DISCHARGE INSTRUCTIONS
Return for severe shortness of breath, any concerns.  Self isolate and socially distance until results of your COVID-19 test are known then act accordingly.

## 2021-04-24 ENCOUNTER — HOSPITAL ENCOUNTER (EMERGENCY)
Dept: HOSPITAL 8 - ED | Age: 46
Discharge: HOME | End: 2021-04-24
Payer: MEDICAID

## 2021-04-24 VITALS — SYSTOLIC BLOOD PRESSURE: 140 MMHG | DIASTOLIC BLOOD PRESSURE: 95 MMHG

## 2021-04-24 VITALS — WEIGHT: 186.07 LBS | HEIGHT: 67 IN | BODY MASS INDEX: 29.2 KG/M2

## 2021-04-24 DIAGNOSIS — Z76.0: ICD-10-CM

## 2021-04-24 DIAGNOSIS — Z86.718: Primary | ICD-10-CM

## 2021-04-24 PROCEDURE — 99281 EMR DPT VST MAYX REQ PHY/QHP: CPT

## 2021-08-09 ENCOUNTER — HOSPITAL ENCOUNTER (EMERGENCY)
Dept: HOSPITAL 8 - ED | Age: 46
Discharge: HOME | End: 2021-08-09
Payer: MEDICAID

## 2021-08-09 VITALS — WEIGHT: 173.94 LBS | HEIGHT: 67 IN | BODY MASS INDEX: 27.3 KG/M2

## 2021-08-09 VITALS — SYSTOLIC BLOOD PRESSURE: 140 MMHG | DIASTOLIC BLOOD PRESSURE: 98 MMHG

## 2021-08-09 DIAGNOSIS — Z20.822: ICD-10-CM

## 2021-08-09 DIAGNOSIS — J98.01: Primary | ICD-10-CM

## 2021-08-09 DIAGNOSIS — F17.200: ICD-10-CM

## 2021-08-09 DIAGNOSIS — M19.90: ICD-10-CM

## 2021-08-09 DIAGNOSIS — Z86.718: ICD-10-CM

## 2021-08-09 DIAGNOSIS — B34.9: ICD-10-CM

## 2021-08-09 PROCEDURE — U0003 INFECTIOUS AGENT DETECTION BY NUCLEIC ACID (DNA OR RNA); SEVERE ACUTE RESPIRATORY SYNDROME CORONAVIRUS 2 (SARS-COV-2) (CORONAVIRUS DISEASE [COVID-19]), AMPLIFIED PROBE TECHNIQUE, MAKING USE OF HIGH THROUGHPUT TECHNOLOGIES AS DESCRIBED BY CMS-2020-01-R: HCPCS

## 2021-08-09 PROCEDURE — 99284 EMERGENCY DEPT VISIT MOD MDM: CPT

## 2021-08-09 PROCEDURE — 71045 X-RAY EXAM CHEST 1 VIEW: CPT

## 2021-08-09 PROCEDURE — 94640 AIRWAY INHALATION TREATMENT: CPT

## 2021-08-09 NOTE — NUR
pt presents to the ed with coughing, sore thoart and congestion. pt states she 
had COVID before. pt in gown, resting on gurney, and placed on continuous 
monitoring and Resp Isolation.

## 2022-12-20 ENCOUNTER — HOSPITAL ENCOUNTER (EMERGENCY)
Facility: MEDICAL CENTER | Age: 47
End: 2022-12-20
Attending: EMERGENCY MEDICINE
Payer: MEDICAID

## 2022-12-20 ENCOUNTER — APPOINTMENT (OUTPATIENT)
Dept: RADIOLOGY | Facility: MEDICAL CENTER | Age: 47
End: 2022-12-20
Attending: EMERGENCY MEDICINE
Payer: MEDICAID

## 2022-12-20 VITALS
TEMPERATURE: 98.5 F | HEART RATE: 73 BPM | RESPIRATION RATE: 18 BRPM | HEIGHT: 67 IN | DIASTOLIC BLOOD PRESSURE: 76 MMHG | OXYGEN SATURATION: 89 % | WEIGHT: 215.17 LBS | BODY MASS INDEX: 33.77 KG/M2 | SYSTOLIC BLOOD PRESSURE: 127 MMHG

## 2022-12-20 DIAGNOSIS — S02.92XA CLOSED FRACTURE OF FACIAL BONE, UNSPECIFIED FACIAL BONE, INITIAL ENCOUNTER (HCC): ICD-10-CM

## 2022-12-20 PROCEDURE — 700111 HCHG RX REV CODE 636 W/ 250 OVERRIDE (IP): Performed by: EMERGENCY MEDICINE

## 2022-12-20 PROCEDURE — 70486 CT MAXILLOFACIAL W/O DYE: CPT

## 2022-12-20 PROCEDURE — 99283 EMERGENCY DEPT VISIT LOW MDM: CPT

## 2022-12-20 PROCEDURE — A9270 NON-COVERED ITEM OR SERVICE: HCPCS | Performed by: EMERGENCY MEDICINE

## 2022-12-20 PROCEDURE — 70450 CT HEAD/BRAIN W/O DYE: CPT

## 2022-12-20 PROCEDURE — 700102 HCHG RX REV CODE 250 W/ 637 OVERRIDE(OP): Performed by: EMERGENCY MEDICINE

## 2022-12-20 RX ORDER — HYDROCODONE BITARTRATE AND ACETAMINOPHEN 5; 325 MG/1; MG/1
1 TABLET ORAL EVERY 4 HOURS PRN
Qty: 10 TABLET | Refills: 0 | Status: SHIPPED | OUTPATIENT
Start: 2022-12-20 | End: 2022-12-22

## 2022-12-20 RX ORDER — IBUPROFEN 200 MG
400 TABLET ORAL EVERY 6 HOURS PRN
COMMUNITY

## 2022-12-20 RX ORDER — ACETAMINOPHEN 325 MG/1
650 TABLET ORAL ONCE
Status: COMPLETED | OUTPATIENT
Start: 2022-12-20 | End: 2022-12-20

## 2022-12-20 RX ORDER — ONDANSETRON 4 MG/1
4 TABLET, ORALLY DISINTEGRATING ORAL ONCE
Status: COMPLETED | OUTPATIENT
Start: 2022-12-20 | End: 2022-12-20

## 2022-12-20 RX ADMIN — ACETAMINOPHEN 650 MG: 325 TABLET, FILM COATED ORAL at 08:50

## 2022-12-20 RX ADMIN — ONDANSETRON 4 MG: 4 TABLET, ORALLY DISINTEGRATING ORAL at 08:46
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
## 2022-12-20 NOTE — ED NOTES
Med rec updated and complete, per pt   Allergies reviewed, per pt  Pt reports that its been 10 months since she took her medications last.  Medications were ELIQUIS 5MG, LOSARTAN 50MG, FLEXERIL 10MG, and PROPRANOLOL 20MG.

## 2023-10-17 ENCOUNTER — APPOINTMENT (OUTPATIENT)
Dept: RADIOLOGY | Facility: MEDICAL CENTER | Age: 48
End: 2023-10-17
Attending: EMERGENCY MEDICINE
Payer: MEDICAID

## 2023-10-17 ENCOUNTER — HOSPITAL ENCOUNTER (EMERGENCY)
Facility: MEDICAL CENTER | Age: 48
End: 2023-10-17
Attending: EMERGENCY MEDICINE
Payer: MEDICAID

## 2023-10-17 VITALS
BODY MASS INDEX: 34.08 KG/M2 | HEIGHT: 67 IN | SYSTOLIC BLOOD PRESSURE: 129 MMHG | HEART RATE: 82 BPM | RESPIRATION RATE: 20 BRPM | OXYGEN SATURATION: 95 % | WEIGHT: 217.15 LBS | TEMPERATURE: 98 F | DIASTOLIC BLOOD PRESSURE: 84 MMHG

## 2023-10-17 DIAGNOSIS — S16.1XXA STRAIN OF NECK MUSCLE, INITIAL ENCOUNTER: ICD-10-CM

## 2023-10-17 DIAGNOSIS — R51.9 ACUTE NONINTRACTABLE HEADACHE, UNSPECIFIED HEADACHE TYPE: ICD-10-CM

## 2023-10-17 LAB
ALBUMIN SERPL BCP-MCNC: 4.3 G/DL (ref 3.2–4.9)
ALBUMIN/GLOB SERPL: 1.7 G/DL
ALP SERPL-CCNC: 86 U/L (ref 30–99)
ALT SERPL-CCNC: 15 U/L (ref 2–50)
ANION GAP SERPL CALC-SCNC: 12 MMOL/L (ref 7–16)
APPEARANCE UR: CLEAR
AST SERPL-CCNC: 12 U/L (ref 12–45)
BASOPHILS # BLD AUTO: 0.8 % (ref 0–1.8)
BASOPHILS # BLD: 0.07 K/UL (ref 0–0.12)
BILIRUB SERPL-MCNC: 0.2 MG/DL (ref 0.1–1.5)
BILIRUB UR QL STRIP.AUTO: NEGATIVE
BUN SERPL-MCNC: 14 MG/DL (ref 8–22)
CALCIUM ALBUM COR SERPL-MCNC: 8.7 MG/DL (ref 8.5–10.5)
CALCIUM SERPL-MCNC: 8.9 MG/DL (ref 8.4–10.2)
CHLORIDE SERPL-SCNC: 106 MMOL/L (ref 96–112)
CO2 SERPL-SCNC: 22 MMOL/L (ref 20–33)
COLOR UR: YELLOW
CREAT SERPL-MCNC: 0.75 MG/DL (ref 0.5–1.4)
EKG IMPRESSION: NORMAL
EOSINOPHIL # BLD AUTO: 0.09 K/UL (ref 0–0.51)
EOSINOPHIL NFR BLD: 1 % (ref 0–6.9)
ERYTHROCYTE [DISTWIDTH] IN BLOOD BY AUTOMATED COUNT: 46.8 FL (ref 35.9–50)
FLUAV RNA SPEC QL NAA+PROBE: NEGATIVE
FLUBV RNA SPEC QL NAA+PROBE: NEGATIVE
GFR SERPLBLD CREATININE-BSD FMLA CKD-EPI: 97 ML/MIN/1.73 M 2
GLOBULIN SER CALC-MCNC: 2.6 G/DL (ref 1.9–3.5)
GLUCOSE SERPL-MCNC: 94 MG/DL (ref 65–99)
GLUCOSE UR STRIP.AUTO-MCNC: NEGATIVE MG/DL
HCG SERPL QL: NEGATIVE
HCT VFR BLD AUTO: 43.4 % (ref 37–47)
HGB BLD-MCNC: 14.3 G/DL (ref 12–16)
IMM GRANULOCYTES # BLD AUTO: 0.01 K/UL (ref 0–0.11)
IMM GRANULOCYTES NFR BLD AUTO: 0.1 % (ref 0–0.9)
KETONES UR STRIP.AUTO-MCNC: NEGATIVE MG/DL
LEUKOCYTE ESTERASE UR QL STRIP.AUTO: NEGATIVE
LYMPHOCYTES # BLD AUTO: 2.74 K/UL (ref 1–4.8)
LYMPHOCYTES NFR BLD: 31.1 % (ref 22–41)
MAGNESIUM SERPL-MCNC: 1.8 MG/DL (ref 1.5–2.5)
MCH RBC QN AUTO: 30.6 PG (ref 27–33)
MCHC RBC AUTO-ENTMCNC: 32.9 G/DL (ref 32.2–35.5)
MCV RBC AUTO: 92.9 FL (ref 81.4–97.8)
MICRO URNS: NORMAL
MONOCYTES # BLD AUTO: 0.52 K/UL (ref 0–0.85)
MONOCYTES NFR BLD AUTO: 5.9 % (ref 0–13.4)
NEUTROPHILS # BLD AUTO: 5.39 K/UL (ref 1.82–7.42)
NEUTROPHILS NFR BLD: 61.1 % (ref 44–72)
NITRITE UR QL STRIP.AUTO: NEGATIVE
NRBC # BLD AUTO: 0 K/UL
NRBC BLD-RTO: 0 /100 WBC (ref 0–0.2)
PH UR STRIP.AUTO: 6 [PH] (ref 5–8)
PLATELET # BLD AUTO: 212 K/UL (ref 164–446)
PMV BLD AUTO: 9.2 FL (ref 9–12.9)
POTASSIUM SERPL-SCNC: 4.2 MMOL/L (ref 3.6–5.5)
PROT SERPL-MCNC: 6.9 G/DL (ref 6–8.2)
PROT UR QL STRIP: NEGATIVE MG/DL
RBC # BLD AUTO: 4.67 M/UL (ref 4.2–5.4)
RBC UR QL AUTO: NEGATIVE
RSV RNA SPEC QL NAA+PROBE: NEGATIVE
SARS-COV-2 RNA RESP QL NAA+PROBE: NOTDETECTED
SODIUM SERPL-SCNC: 140 MMOL/L (ref 135–145)
SP GR UR STRIP.AUTO: 1.02
SPECIMEN SOURCE: NORMAL
WBC # BLD AUTO: 8.8 K/UL (ref 4.8–10.8)

## 2023-10-17 PROCEDURE — 85025 COMPLETE CBC W/AUTO DIFF WBC: CPT

## 2023-10-17 PROCEDURE — A9270 NON-COVERED ITEM OR SERVICE: HCPCS | Performed by: EMERGENCY MEDICINE

## 2023-10-17 PROCEDURE — C9803 HOPD COVID-19 SPEC COLLECT: HCPCS | Performed by: EMERGENCY MEDICINE

## 2023-10-17 PROCEDURE — 700117 HCHG RX CONTRAST REV CODE 255: Performed by: EMERGENCY MEDICINE

## 2023-10-17 PROCEDURE — 70496 CT ANGIOGRAPHY HEAD: CPT

## 2023-10-17 PROCEDURE — 700111 HCHG RX REV CODE 636 W/ 250 OVERRIDE (IP): Performed by: EMERGENCY MEDICINE

## 2023-10-17 PROCEDURE — 70498 CT ANGIOGRAPHY NECK: CPT

## 2023-10-17 PROCEDURE — 71045 X-RAY EXAM CHEST 1 VIEW: CPT

## 2023-10-17 PROCEDURE — 700105 HCHG RX REV CODE 258: Performed by: EMERGENCY MEDICINE

## 2023-10-17 PROCEDURE — 36415 COLL VENOUS BLD VENIPUNCTURE: CPT

## 2023-10-17 PROCEDURE — 93005 ELECTROCARDIOGRAM TRACING: CPT | Performed by: EMERGENCY MEDICINE

## 2023-10-17 PROCEDURE — 700102 HCHG RX REV CODE 250 W/ 637 OVERRIDE(OP): Performed by: EMERGENCY MEDICINE

## 2023-10-17 PROCEDURE — 0241U HCHG SARS-COV-2 COVID-19 NFCT DS RESP RNA 4 TRGT MIC: CPT

## 2023-10-17 PROCEDURE — 80053 COMPREHEN METABOLIC PANEL: CPT

## 2023-10-17 PROCEDURE — 96374 THER/PROPH/DIAG INJ IV PUSH: CPT

## 2023-10-17 PROCEDURE — 99285 EMERGENCY DEPT VISIT HI MDM: CPT

## 2023-10-17 PROCEDURE — 81003 URINALYSIS AUTO W/O SCOPE: CPT

## 2023-10-17 PROCEDURE — 83735 ASSAY OF MAGNESIUM: CPT

## 2023-10-17 PROCEDURE — 96375 TX/PRO/DX INJ NEW DRUG ADDON: CPT

## 2023-10-17 PROCEDURE — 84703 CHORIONIC GONADOTROPIN ASSAY: CPT

## 2023-10-17 RX ORDER — CYCLOBENZAPRINE HCL 5 MG
5-10 TABLET ORAL 3 TIMES DAILY PRN
Qty: 30 TABLET | Refills: 0 | Status: SHIPPED | OUTPATIENT
Start: 2023-10-17

## 2023-10-17 RX ORDER — PROCHLORPERAZINE EDISYLATE 5 MG/ML
10 INJECTION INTRAMUSCULAR; INTRAVENOUS ONCE
Status: COMPLETED | OUTPATIENT
Start: 2023-10-17 | End: 2023-10-17

## 2023-10-17 RX ORDER — ACETAMINOPHEN 325 MG/1
650 TABLET ORAL ONCE
Status: COMPLETED | OUTPATIENT
Start: 2023-10-17 | End: 2023-10-17

## 2023-10-17 RX ORDER — LISINOPRIL 20 MG/1
20 TABLET ORAL DAILY
Qty: 30 TABLET | Refills: 0 | Status: SHIPPED | OUTPATIENT
Start: 2023-10-17 | End: 2023-11-16

## 2023-10-17 RX ORDER — CYCLOBENZAPRINE HCL 10 MG
10 TABLET ORAL ONCE
Status: COMPLETED | OUTPATIENT
Start: 2023-10-17 | End: 2023-10-17

## 2023-10-17 RX ORDER — DIPHENHYDRAMINE HYDROCHLORIDE 50 MG/ML
25 INJECTION INTRAMUSCULAR; INTRAVENOUS ONCE
Status: COMPLETED | OUTPATIENT
Start: 2023-10-17 | End: 2023-10-17

## 2023-10-17 RX ORDER — SODIUM CHLORIDE, SODIUM LACTATE, POTASSIUM CHLORIDE, CALCIUM CHLORIDE 600; 310; 30; 20 MG/100ML; MG/100ML; MG/100ML; MG/100ML
1000 INJECTION, SOLUTION INTRAVENOUS ONCE
Status: COMPLETED | OUTPATIENT
Start: 2023-10-17 | End: 2023-10-17

## 2023-10-17 RX ORDER — LORAZEPAM 2 MG/ML
1 INJECTION INTRAMUSCULAR ONCE
Status: COMPLETED | OUTPATIENT
Start: 2023-10-17 | End: 2023-10-17

## 2023-10-17 RX ADMIN — LORAZEPAM 1 MG: 2 INJECTION INTRAMUSCULAR; INTRAVENOUS at 19:23

## 2023-10-17 RX ADMIN — PROCHLORPERAZINE EDISYLATE 10 MG: 5 INJECTION INTRAMUSCULAR; INTRAVENOUS at 18:56

## 2023-10-17 RX ADMIN — ACETAMINOPHEN 650 MG: 325 TABLET ORAL at 18:49

## 2023-10-17 RX ADMIN — SODIUM CHLORIDE, POTASSIUM CHLORIDE, SODIUM LACTATE AND CALCIUM CHLORIDE 1000 ML: 600; 310; 30; 20 INJECTION, SOLUTION INTRAVENOUS at 19:01

## 2023-10-17 RX ADMIN — CYCLOBENZAPRINE 10 MG: 10 TABLET, FILM COATED ORAL at 21:13

## 2023-10-17 RX ADMIN — IOHEXOL 80 ML: 350 INJECTION, SOLUTION INTRAVENOUS at 19:56

## 2023-10-17 RX ADMIN — DIPHENHYDRAMINE HYDROCHLORIDE 25 MG: 50 INJECTION, SOLUTION INTRAMUSCULAR; INTRAVENOUS at 18:56
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
## 2023-10-18 NOTE — ED NOTES
Patient is stable for discharge at this time, anticipatory guidance provided, close follow-up is encouraged, and ED return instructions have been detailed. Patient is both agreeable to the disposition and plan and discharged home in ambulatory state and in good condition.     Rx education provided, Pt verbalized understanding;

## 2023-10-18 NOTE — ED PROVIDER NOTES
"ED Provider Note    CHIEF COMPLAINT  Chief Complaint   Patient presents with    Migraine     Started about a week ago  has taken OTC med's w/out effect   having R side neck and shoulder pain     Other     Pt states she is concerned that her potassium might be low  has been prior about 6 yr ago   has been really tired and weak   been very sleepy as well          HPI/ROS  LIMITATION TO HISTORY   Select: : None      Ele Page is a 48 y.o. female who presents w/ CC of R sided HA.    Pt reports cough  Pt reports she has been off meds for last 2 years. (Propanolol)  PT reports intermittent vision changes. She describes an episode this AM with double vision for 15 minutes.   PT reports prior hx of DVT.   Pt denies accident or injury.   Pt denies over the counter meds besides taking liquid IV and vitamin C and D for last 3 days.     PT denies burning w/ urination.   Pt denies rashes.   Pt denies hx of cancer.           PAST MEDICAL HISTORY   has a past medical history of Acute DVT (deep venous thrombosis) (HCC) and Hypertension.    SURGICAL HISTORY  patient denies any surgical history    FAMILY HISTORY  No family history on file.    SOCIAL HISTORY  Social History     Tobacco Use    Smoking status: Every Day     Current packs/day: 2.00     Types: Cigarettes    Smokeless tobacco: Never   Vaping Use    Vaping Use: Never used   Substance and Sexual Activity    Alcohol use: Not Currently    Drug use: Not Currently    Sexual activity: Not on file       CURRENT MEDICATIONS  Home Medications       Reviewed by Gali Colunga R.N. (Registered Nurse) on 10/17/23 at 1648  Med List Status: Partial     Medication Last Dose Status   ibuprofen (MOTRIN) 200 MG Tab  Active                    ALLERGIES  Allergies   Allergen Reactions    Compazine [Prochlorperazine]      Made Pt very anxious.       PHYSICAL EXAM  VITAL SIGNS: /84   Pulse 82   Temp 36.7 °C (98 °F) (Temporal)   Resp 20   Ht 1.702 m (5' 7\")   Wt 98.5 kg (217 lb " 2.5 oz)   SpO2 95%   BMI 34.01 kg/m²      Constitutional: Alert in no apparent distress.  HENT: No signs of trauma, Bilateral external ears normal, Nose normal.   Eyes: Pupils are equal and reactive, Conjunctiva normal, Non-icteric.   Neck: Normal range of motion, No tenderness, Supple, No stridor.   Lymphatic: No lymphadenopathy noted.   Cardiovascular: Regular rate and rhythm, no murmurs.   Thorax & Lungs: Normal breath sounds, No respiratory distress, No wheezing, No chest tenderness.   Abdomen: Bowel sounds normal, Soft, No tenderness, No peritoneal signs, No masses, No pulsatile masses.   Skin: Warm, Dry, No erythema, No rash.   Back: No bony tenderness, No CVA tenderness.   Extremities: Intact distal pulses, No edema, No tenderness, No cyanosis.  Musculoskeletal: Good range of motion in all major joints. No tenderness to palpation or major deformities noted.   Neurologic: Alert , Normal motor function, Normal sensory function, No focal deficits noted.   Psychiatric: Affect normal, Judgment normal, Mood normal.      DIAGNOSTIC STUDIES / PROCEDURES      LABS  Labs Reviewed   CBC WITH DIFFERENTIAL   COMP METABOLIC PANEL   MAGNESIUM   COV-2, FLU A/B, AND RSV BY PCR (L-3 [facility])    Narrative:     Release to patient->Immediate   HCG QUAL SERUM   ESTIMATED GFR   URINALYSIS    Narrative:     Release to patient->Immediate        RADIOLOGY  I have independently interpreted the diagnostic imaging associated with this visit and am waiting the final reading from the radiologist.   My preliminary interpretation is as follows: no ICH or large mass  Radiologist interpretation:   CT-CTA NECK WITH & W/O-POST PROCESSING   Final Result         1.  CT angiogram of the neck within normal limits.         CT-CTA HEAD WITH & W/O-POST PROCESS   Final Result         1.  No large vessel occlusion or aneurysm identified.      DX-CHEST-PORTABLE (1 VIEW)   Final Result      1.  There is no acute cardiopulmonary process.           COURSE &  MEDICAL DECISION MAKING        INITIAL ASSESSMENT, COURSE AND PLAN  Care Narrative:   48-year-old presents with intermittent headaches and intermittent visual changes  Patient's headache not maximal at onset  Given intermittent visual changes CTA obtained to ensure no mass or abnormal blood flow  No mass or abnormal blood flow noted  No abnormal electrolytes noted  No evidence of UTI  Patient not pregnant  Patient with significant improvement in symptoms after Flexeril and Ativan and Compazine and Benadryl for headache  Compazine did make patient feel slightly anxious which improved after Ativan  Patient agrees to call to schedule follow-up appointment as outpatient  Patient with likely cervical strain and prescribed Flexeril as outpatient  Patient agrees to call to schedule follow-up appointment with primary care given elevated blood pressure  I refilled patient's lisinopril        DISPOSITION AND DISCUSSIONS      Discussion of management with other \[facility]\"" or appropriate source(s): None     Escalation of care considered, and ultimately not performed:acute inpatient care management, however at this time, the patient is most appropriate for outpatient management          FINAL DIAGNOSIS  1. Acute nonintractable headache, unspecified headache type    2. Strain of neck muscle, initial encounter           Electronically signed by: Evan Ta M.D., 10/17/2023 6:25 PM

## 2023-10-18 NOTE — ED NOTES
Pt medicated, denied having any needs at this time, stated that she already feels a little better now, no distress noted;

## 2023-10-18 NOTE — ED NOTES
PIV placed, blood work and COVID swab collected & sent to lab, Pt medicated per MAR, Pt denied having any needs at this time, no distress noted;    Pt aware that she is waiting for imaging to be completed;

## 2023-10-18 NOTE — DISCHARGE INSTRUCTIONS
Please discuss the following findings with your regular doctor:  CT-CTA NECK WITH & W/O-POST PROCESSING   Final Result         1.  CT angiogram of the neck within normal limits.         CT-CTA HEAD WITH & W/O-POST PROCESS   Final Result         1.  No large vessel occlusion or aneurysm identified.      DX-CHEST-PORTABLE (1 VIEW)   Final Result      1.  There is no acute cardiopulmonary process.        Labs Reviewed   CBC WITH DIFFERENTIAL   COMP METABOLIC PANEL   MAGNESIUM   COV-2, FLU A/B, AND RSV BY PCR ([employer])    Narrative:     Release to patient->Immediate   HCG QUAL SERUM   ESTIMATED GFR

## 2023-10-23 ENCOUNTER — TELEPHONE (OUTPATIENT)
Dept: HEALTH INFORMATION MANAGEMENT | Facility: OTHER | Age: 48
End: 2023-10-23
Payer: MEDICAID

## 2024-11-27 ENCOUNTER — HOSPITAL ENCOUNTER (EMERGENCY)
Facility: MEDICAL CENTER | Age: 49
End: 2024-11-27
Attending: STUDENT IN AN ORGANIZED HEALTH CARE EDUCATION/TRAINING PROGRAM
Payer: MEDICAID

## 2024-11-27 ENCOUNTER — APPOINTMENT (OUTPATIENT)
Dept: RADIOLOGY | Facility: MEDICAL CENTER | Age: 49
End: 2024-11-27
Attending: STUDENT IN AN ORGANIZED HEALTH CARE EDUCATION/TRAINING PROGRAM
Payer: MEDICAID

## 2024-11-27 VITALS
HEIGHT: 67 IN | OXYGEN SATURATION: 93 % | WEIGHT: 228.62 LBS | SYSTOLIC BLOOD PRESSURE: 136 MMHG | TEMPERATURE: 97.4 F | HEART RATE: 86 BPM | BODY MASS INDEX: 35.88 KG/M2 | RESPIRATION RATE: 18 BRPM | DIASTOLIC BLOOD PRESSURE: 85 MMHG

## 2024-11-27 DIAGNOSIS — M79.671 FOOT PAIN, RIGHT: ICD-10-CM

## 2024-11-27 PROCEDURE — 700102 HCHG RX REV CODE 250 W/ 637 OVERRIDE(OP): Performed by: STUDENT IN AN ORGANIZED HEALTH CARE EDUCATION/TRAINING PROGRAM

## 2024-11-27 PROCEDURE — A9270 NON-COVERED ITEM OR SERVICE: HCPCS | Performed by: STUDENT IN AN ORGANIZED HEALTH CARE EDUCATION/TRAINING PROGRAM

## 2024-11-27 PROCEDURE — 99283 EMERGENCY DEPT VISIT LOW MDM: CPT

## 2024-11-27 PROCEDURE — 73620 X-RAY EXAM OF FOOT: CPT | Mod: RT

## 2024-11-27 RX ORDER — IBUPROFEN 600 MG/1
600 TABLET, FILM COATED ORAL ONCE
Status: COMPLETED | OUTPATIENT
Start: 2024-11-27 | End: 2024-11-27

## 2024-11-27 RX ORDER — ACETAMINOPHEN 325 MG/1
650 TABLET ORAL ONCE
Status: COMPLETED | OUTPATIENT
Start: 2024-11-27 | End: 2024-11-27

## 2024-11-27 RX ORDER — SULFAMETHOXAZOLE AND TRIMETHOPRIM 800; 160 MG/1; MG/1
1 TABLET ORAL 2 TIMES DAILY
Qty: 10 TABLET | Refills: 0 | Status: ACTIVE | OUTPATIENT
Start: 2024-11-27 | End: 2024-12-02

## 2024-11-27 RX ORDER — PROPRANOLOL HCL 20 MG
20 TABLET ORAL ONCE
Status: COMPLETED | OUTPATIENT
Start: 2024-11-27 | End: 2024-11-27

## 2024-11-27 RX ADMIN — ACETAMINOPHEN 650 MG: 325 TABLET ORAL at 22:43

## 2024-11-27 RX ADMIN — PROPRANOLOL HYDROCHLORIDE 20 MG: 20 TABLET ORAL at 22:43

## 2024-11-27 RX ADMIN — IBUPROFEN 600 MG: 600 TABLET, FILM COATED ORAL at 22:43

## 2024-11-27 ASSESSMENT — FIBROSIS 4 INDEX: FIB4 SCORE: 0.73

## 2024-11-28 NOTE — ED TRIAGE NOTES
"Chief Complaint   Patient presents with    Foot Pain     R foot. States pain started in August and believes there may be FB in foot- \"a thorn\".      Physical Exam  Pulmonary:      Effort: Pulmonary effort is normal.   Skin:     General: Skin is warm and dry.   Neurological:      Mental Status: She is alert.       BP (!) 181/114   Pulse (!) 102   Temp 36.3 °C (97.4 °F) (Temporal)   Resp 20   Ht 1.702 m (5' 7\")   Wt 104 kg (228 lb 9.9 oz)   LMP 10/27/2024   SpO2 93%   BMI 35.81 kg/m²     "

## 2024-11-28 NOTE — ED NOTES
Rounded on pt. POC explained to pt, denies needs or complaints at this time. Call light in reach.

## 2024-11-28 NOTE — ED PROVIDER NOTES
"CHIEF COMPLAINT  Chief Complaint   Patient presents with    Foot Pain     R foot. States pain started in August and believes there may be FB in foot- \"a thorn\".        LIMITATION TO HISTORY   Select: None   HPI    Ele Page is a 49 y.o. female who presents to the Emergency Department evaluation of right foot pain which she states has been ongoing since August she states that she stepped on a thorn in August and has had persistent pain in her foot since then.  No fevers no trauma no falls no other complaints. Stated that she tried to dig out the forgein body recently and did have some purulent drainage.    OUTSIDE HISTORIAN(S):  Select: None    EXTERNAL RECORDS REVIEWED  Select: Other [facility] ED visit seen for evaluation of a headache, office visit 11/20/2024 was seen for a follow-up visit, does have a history of hypertension COPD anxiety      PAST MEDICAL HISTORY  Past Medical History:   Diagnosis Date    Acute DVT (deep venous thrombosis) (HCC)     Asthma     Chronic obstructive pulmonary disease (HCC)     Hypertension      .    SURGICAL HISTORY  History reviewed. No pertinent surgical history.      FAMILY HISTORY  No family history on file.       SOCIAL HISTORY  Social History     Socioeconomic History    Marital status: Legally      Spouse name: Not on file    Number of children: Not on file    Years of education: Not on file    Highest education level: Not on file   Occupational History    Not on file   Tobacco Use    Smoking status: Every Day     Current packs/day: 2.00     Types: Cigarettes    Smokeless tobacco: Never   Vaping Use    Vaping status: Never Used   Substance and Sexual Activity    Alcohol use: Not Currently    Drug use: Not Currently    Sexual activity: Not on file   Other Topics Concern    Not on file   Social History Narrative    ** Merged History Encounter **          Social Drivers of Health     Financial Resource Strain: Not on file   Food Insecurity: Not on file " "  Transportation Needs: Not on file   Physical Activity: Not on file   Stress: Not on file   Social Connections: Not on file   Intimate Partner Violence: Not on file   Housing Stability: Not on file         CURRENT MEDICATIONS  No current facility-administered medications on file prior to encounter.     Current Outpatient Medications on File Prior to Encounter   Medication Sig Dispense Refill    cyclobenzaprine (FLEXERIL) 5 mg tablet Take 1-2 Tablets by mouth 3 times a day as needed for Muscle Spasms. 30 Tablet 0    ibuprofen (MOTRIN) 200 MG Tab Take 400 mg by mouth every 6 hours as needed. Indications: Pain             ALLERGIES  Allergies   Allergen Reactions    Compazine [Prochlorperazine]      Made Pt very anxious.       PHYSICAL EXAM  VITAL SIGNS:/85   Pulse 86   Temp 36.3 °C (97.4 °F) (Temporal)   Resp 18   Ht 1.702 m (5' 7\")   Wt 104 kg (228 lb 9.9 oz)   LMP 10/27/2024   SpO2 93%   BMI 35.81 kg/m²       VITALS - vital signs documented prior to this note have been reviewed and noted,  GENERAL - awake, alert, oriented, GCS 15, no apparent distress, non-toxic  appearing  HEENT - normocephalic, atraumatic, pupils equal, sclera anicteric, mucus  membranes moist  NECK - supple, no meningismus, full active range of motion, trachea midline  CARDIOVASCULAR - regular rate/rhythm, no murmurs/gallops/rubs  PULMONARY - no respiratory distress, speaking in full sentences, clear to  auscultation bilaterally, no wheezing/ronchi/rales, no accessory muscle use  Extremities: Tenderness  along the plantar aspect near her 4th proximal phalangeal joint, no overlying erythema, fluctuance, there is a callus in this region    DIAGNOSTIC STUDIES / PROCEDURES      RADIOLOGY  I have independently interpreted the diagnostic imaging associated with this visit and am waiting the final reading from the radiologist.   My preliminary interpretation is as follows: No obvious radiopaque foreign body, no fracture    Point of Care " Ultrasound    ED POINT OF CARE ULTRASOUND: LIMITED SKIN/SOFT TISSUE, FOREIGN BODY IDENTIFICATION    Indication: Swelling, Pain, and Concern for foreign body  Procedure: A limited ultrasound of the patients skin and soft tissue was performed at the area of clinical concern as noted.     There was not evidence of a foreign body present.   There was not evidence of cobblestoning of the soft tissues.   A localized fluid collection was present.     Impression: Based on this limited bedside ultrasound, there was not evidence of cellulitis, and a drainable fluid collection was seen. There was not evidence of a foreign body. Further clinical interpretation or comments:     Image retained through [employer] as seen below:         With compression    Additional interpretation: Small 0.16 cm fluid collection    This study is a limited ultrasound examination performed and interpreted to evaluate for limited conditions as outlined above. There may be other clinically important information contained in the images that is outside this scope. When clinically warranted, a comprehensive ultrasound through the appropriate department is considered.       Radiologist interpretation:   DX-FOOT-2- RIGHT   Final Result      No acute fracture or dislocation is noted.           COURSE & MEDICAL DECISION MAKING    ED COURSE:        INTERVENTIONS BY ME:  Medications   propranolol (Inderal) tablet 20 mg (20 mg Oral Given 11/27/24 2243)   acetaminophen (Tylenol) tablet 650 mg (650 mg Oral Given 11/27/24 2243)   ibuprofen (Motrin) tablet 600 mg (600 mg Oral Given 11/27/24 2243)                 INITIAL ASSESSMENT, COURSE AND PLAN  Care Narrative: Patient presented for evaluation of ongoing right foot pain which has been present for the past several months after she stated that she stepped on a thorn.  She recently tried to take out the foreign bodies she had been having persistent pain in this area did state that there was some purulent material.  On  examination there appears to be a callus over the medial aspect of her fourth metatarsal on the plantar aspect.  No surrounding erythema.  X-ray was obtained was negative for foreign body.  Bedside ultrasound did not identify an obvious foreign body there was a very small fluid collection, given the report of purulent drainage may represent a small abscess.  Did attempt a bedside I&D with a simple stab incision with a needle was unable to express any purulent material.  Will place patient on antibiotics instruct her on Tylenol ibuprofen as well as return precautions patient was discharged in stable condition.             ADDITIONAL PROBLEM LIST    DISPOSITION AND DISCUSSIONS    Decision tools and prescription drugs considered including, but not limited to: Antibiotics   .    FINAL DIAGNOSIS  1. Foot pain, right             Electronically signed by: Earl Perez DO ,11:41 PM 11/27/24